# Patient Record
Sex: FEMALE | Race: WHITE | NOT HISPANIC OR LATINO | Employment: FULL TIME | URBAN - METROPOLITAN AREA
[De-identification: names, ages, dates, MRNs, and addresses within clinical notes are randomized per-mention and may not be internally consistent; named-entity substitution may affect disease eponyms.]

---

## 2019-04-16 ENCOUNTER — TRANSCRIBE ORDERS (OUTPATIENT)
Dept: PHYSICAL THERAPY | Facility: CLINIC | Age: 39
End: 2019-04-16

## 2019-04-16 DIAGNOSIS — M79.601 RIGHT ARM PAIN: Primary | ICD-10-CM

## 2019-04-19 ENCOUNTER — TREATMENT (OUTPATIENT)
Dept: PHYSICAL THERAPY | Facility: CLINIC | Age: 39
End: 2019-04-19

## 2019-04-19 DIAGNOSIS — M25.511 ACUTE PAIN OF RIGHT SHOULDER: Primary | ICD-10-CM

## 2019-04-19 PROCEDURE — 97110 THERAPEUTIC EXERCISES: CPT | Performed by: PHYSICAL THERAPIST

## 2019-04-19 PROCEDURE — 97162 PT EVAL MOD COMPLEX 30 MIN: CPT | Performed by: PHYSICAL THERAPIST

## 2019-04-19 NOTE — PROGRESS NOTES
Physical Therapy Initial Evaluation and Plan of Care    TOTAL TIME: 60 MINUTES    Subjective Evaluation    History of Present Illness  Mechanism of injury: Patient presents with ~ 2 month history of right UE pain; states it starts in the right side of cervical spine, extends down into the right hand and fingers    Works at [employer]- does repetitive work packaging materials and supplies, labeling, etc.    Is currently on light duty, but pain is the same  Arm is tingling constantly     Quality of life: fair    Pain  Current pain rating: 3  Quality: dull ache, discomfort, needle-like, tight and radiating  Relieving factors: rest and medications  Aggravating factors: lifting, movement, overhead activity, prolonged positioning and outstretched reach    Patient Goals  Patient goals for therapy: independence with ADLs/IADLs, return to sport/leisure activities, return to work, increased strength, increased motion and decreased pain             Objective       Postural Observations  Seated posture: fair  Standing posture: fair        Palpation     Right Tenderness of the infraspinatus, latissimus, levator scapulae, rhomboids, scalenes, sternocleidomastoid, suboccipitals and upper trapezius.     Tenderness     Right Shoulder  Tenderness in the AC joint and infraspinatus tendon. No tenderness in the clavicle.     Cervical/Thoracic Screen   Cervical range of motion within normal limits with the following exceptions: Pain with cervical extension  Pain/ tightness with cervical rotation    Neurological Testing     Sensation     Shoulder   Left Shoulder   Intact: light touch    Right Shoulder   Intact: light touch    Reflexes   Left   Biceps (C5/C6): normal (2+)  Brachioradialis (C6): normal (2+)  Triceps (C7): normal (2+)    Right   Biceps (C5/C6): normal (2+)  Brachioradialis (C6): normal (2+)  Triceps (C7): normal (2+)    Active Range of Motion   Cervical/Thoracic Spine   Cervical    Flexion: WFL  Extension: WFL and with  pain  Left lateral flexion: WFL  Right lateral flexion: WFL  Left rotation: WFL and with pain  Right rotation: WFL and with pain  Left Shoulder   Normal active range of motion    Right Shoulder   Normal active range of motion  Abduction: with pain  Horizontal adduction: with pain    Strength/Myotome Testing     Right Shoulder     Planes of Motion   Flexion: 4+   Abduction: 4+   External rotation at 0°: 4   Internal rotation at 0°: 4+     Additional Strength Details   strength;  Right: 25#  Left:55#       Tests   Cervical     Right   Positive Spurling's sign.   Negative cervical distraction.     Right Shoulder   Positive empty can.   Negative active compression ([place]), drop arm, lift-off and Aristeo's sign.          Assessment & Plan     Assessment  Impairments: abnormal or restricted ROM, activity intolerance, impaired physical strength, lacks appropriate home exercise program and pain with function  Assessment details: S/S consistent with right UE pain secondary to repetitive work; pain begins in cervical spine and extends down the right arm in a dermatomal fashion; patient should benefit from PT to restore full functional ROM and strength, and decreased pain through the use of modalities and manual therapy  Prognosis: fair  Functional Limitations: carrying objects, lifting, sleeping, pulling, pushing, uncomfortable because of pain, reaching overhead and unable to perform repetitive tasks  Goals  Plan Goals: 4 weeks:  1. IND with HEP  2. Full functional ROM of cervical spine and right shoulder without pain  3. 5/5 MMT strength right UE and equal  strength bilaterally  4. Able to perform work simulated tasks without pain or dysfunction      Plan  Therapy options: will be seen for skilled physical therapy services  Planned modality interventions: iontophoresis, TENS, thermotherapy (hydrocollator packs), ultrasound, traction, high voltage pulsed current (pain management), electrical stimulation/[place]ian  stimulation and cryotherapy  Planned therapy interventions: manual therapy, neuromuscular re-education, postural training, soft tissue mobilization, spinal/joint mobilization, strengthening, stretching, therapeutic activities, joint mobilization, home exercise program, functional ROM exercises, flexibility and body mechanics training  Frequency: 2x week  Duration in visits: 9  Treatment plan discussed with: patient        Manual Therapy:         mins  62851;  Therapeutic Exercise:    30     mins  95474;     Neuromuscular Wendie:        mins  09890;    Therapeutic Activity:          mins  73560;     Gait Training:           mins  86123;     Ultrasound:          mins  52762;    Electrical Stimulation:         mins  43027 ( );  Dry Needling          mins self-pay    Timed Treatment:   30   mins   Total Treatment:     60   mins    PT SIGNATURE: Aris Canas, JONNY   DATE TREATMENT INITIATED: 4/19/2019    Initial Certification  Certification Period: 7/18/2019  I certify that the therapy services are furnished while this patient is under my care.  The services outlined above are required by this patient, and will be reviewed every 90 days.     PHYSICIAN: Bronwyn Lewis PA      DATE:     Please sign and return via fax to  .. Thank you, [facility].

## 2019-04-23 ENCOUNTER — TREATMENT (OUTPATIENT)
Dept: PHYSICAL THERAPY | Facility: CLINIC | Age: 39
End: 2019-04-23

## 2019-04-23 DIAGNOSIS — G89.29 CHRONIC RIGHT SHOULDER PAIN: Primary | ICD-10-CM

## 2019-04-23 DIAGNOSIS — M25.511 CHRONIC RIGHT SHOULDER PAIN: Primary | ICD-10-CM

## 2019-04-23 PROCEDURE — 97110 THERAPEUTIC EXERCISES: CPT | Performed by: PHYSICAL THERAPIST

## 2019-04-23 NOTE — PROGRESS NOTES
Physical Therapy Daily Progress Note    TOTAL TIME: 75 MINUTES    Stefanie Liang reports: right arm continues to hurt; tingling in hand      Objective   See Exercise, Manual, and Modality Logs for complete treatment.     THERAPEUTIC EXERCISES/ACTIVITIES ADDED TODAY: see flow sheets for additions    Assessment/Plan  PATIENT NEEDS CONTINUED PHYSICAL THERAPY IN ORDER TO ACHIEVE FULL ROM, STRENGTH AND FUNCTION WITH NO REPORTS OF PAIN IN ORDER TO RTW WITHOUT RESTRICTIONS AND WITHOUT PAIN OR DYSFUNCTION       Progress per Plan of Care           Manual Therapy:         mins  39839;  Therapeutic Exercise:    60     mins  94864;     Neuromuscular Wendie:        mins  60923;    Therapeutic Activity:          mins  61264;     Gait Training:           mins  59542;     Ultrasound:          mins  67387;    Electrical Stimulation:    15     mins  88276 ( );  Dry Needling          mins self-pay    Timed Treatment:   75   mins   Total Treatment:     75   mins    Aris Canas PT  Physical Therapist

## 2019-04-25 ENCOUNTER — TREATMENT (OUTPATIENT)
Dept: PHYSICAL THERAPY | Facility: CLINIC | Age: 39
End: 2019-04-25

## 2019-04-25 DIAGNOSIS — M25.511 CHRONIC RIGHT SHOULDER PAIN: Primary | ICD-10-CM

## 2019-04-25 DIAGNOSIS — G89.29 CHRONIC RIGHT SHOULDER PAIN: Primary | ICD-10-CM

## 2019-04-25 PROCEDURE — 97110 THERAPEUTIC EXERCISES: CPT | Performed by: PHYSICAL THERAPIST

## 2019-04-25 PROCEDURE — 97014 ELECTRIC STIMULATION THERAPY: CPT | Performed by: PHYSICAL THERAPIST

## 2019-05-01 ENCOUNTER — TREATMENT (OUTPATIENT)
Dept: PHYSICAL THERAPY | Facility: CLINIC | Age: 39
End: 2019-05-01

## 2019-05-01 DIAGNOSIS — G89.29 CHRONIC RIGHT SHOULDER PAIN: Primary | ICD-10-CM

## 2019-05-01 DIAGNOSIS — M25.511 CHRONIC RIGHT SHOULDER PAIN: Primary | ICD-10-CM

## 2019-05-01 PROCEDURE — 97110 THERAPEUTIC EXERCISES: CPT | Performed by: PHYSICAL THERAPIST

## 2019-05-03 ENCOUNTER — TREATMENT (OUTPATIENT)
Dept: PHYSICAL THERAPY | Facility: CLINIC | Age: 39
End: 2019-05-03

## 2019-05-03 DIAGNOSIS — G89.29 CHRONIC RIGHT SHOULDER PAIN: Primary | ICD-10-CM

## 2019-05-03 DIAGNOSIS — M25.511 CHRONIC RIGHT SHOULDER PAIN: Primary | ICD-10-CM

## 2019-05-03 PROCEDURE — 97110 THERAPEUTIC EXERCISES: CPT | Performed by: PHYSICAL THERAPIST

## 2019-05-03 PROCEDURE — 97140 MANUAL THERAPY 1/> REGIONS: CPT | Performed by: PHYSICAL THERAPIST

## 2019-05-03 NOTE — PROGRESS NOTES
Physical Therapy Daily Progress Note    TOTAL TIME: 70 MINUTES    Elen Liang reports: continued pain, work makes it hurt worse, a lot of repetition       Objective   See Exercise, Manual, and Modality Logs for complete treatment.     THERAPEUTIC EXERCISES/ACTIVITIES ADDED TODAY: see flow sheets    Assessment/Plan  PATIENT NEEDS CONTINUED PHYSICAL THERAPY IN ORDER TO ACHIEVE FULL ROM, STRENGTH AND FUNCTION WITH NO REPORTS OF PAIN IN ORDER TO RTW WITHOUT RESTRICTIONS AND WITHOUT PAIN OR DYSFUNCTION       Progress per Plan of Care           Manual Therapy:    55     mins  07791;  Therapeutic Exercise:         mins  52131;     Neuromuscular Wendie:        mins  75458;    Therapeutic Activity:          mins  33377;     Gait Training:           mins  21574;     Ultrasound:          mins  60825;    Electrical Stimulation:  15       mins  69731 ( );  Dry Needling          mins self-pay    Timed Treatment:   55   mins   Total Treatment:     70   mins    Aris Canas PT  Physical Therapist

## 2019-05-07 ENCOUNTER — TREATMENT (OUTPATIENT)
Dept: PHYSICAL THERAPY | Facility: CLINIC | Age: 39
End: 2019-05-07

## 2019-05-07 DIAGNOSIS — G89.29 CHRONIC RIGHT SHOULDER PAIN: Primary | ICD-10-CM

## 2019-05-07 DIAGNOSIS — M25.511 CHRONIC RIGHT SHOULDER PAIN: Primary | ICD-10-CM

## 2019-05-07 PROCEDURE — 97110 THERAPEUTIC EXERCISES: CPT | Performed by: PHYSICAL THERAPIST

## 2019-05-07 NOTE — PROGRESS NOTES
Physical Therapy Daily Progress Note    TOTAL TIME: 75 MINUTES    Elen Liang reports: continued pain, work duties make it worse      Objective   See Exercise, Manual, and Modality Logs for complete treatment.     THERAPEUTIC EXERCISES/ACTIVITIES ADDED TODAY: see flow sheets    Assessment/Plan  PATIENT NEEDS CONTINUED PHYSICAL THERAPY IN ORDER TO ACHIEVE FULL ROM, STRENGTH AND FUNCTION WITH NO REPORTS OF PAIN IN ORDER TO RTW WITHOUT RESTRICTIONS AND WITHOUT PAIN OR DYSFUNCTION       Progress per Plan of Care           Manual Therapy:    10     mins  84676;  Therapeutic Exercise:    55     mins  58411;     Neuromuscular Wendie:        mins  84598;    Therapeutic Activity:          mins  85464;     Gait Training:           mins  52263;     Ultrasound:          mins  83502;    Electrical Stimulation:         mins  03842 ( );  Dry Needling          mins self-pay    Timed Treatment:   65   mins   Total Treatment:     75   mins    Aris Canas PT  Physical Therapist

## 2019-05-10 ENCOUNTER — OFFICE VISIT (OUTPATIENT)
Dept: ORTHOPEDIC SURGERY | Facility: CLINIC | Age: 39
End: 2019-05-10

## 2019-05-10 VITALS — HEIGHT: 61 IN | OXYGEN SATURATION: 98 % | WEIGHT: 113.1 LBS | HEART RATE: 88 BPM | BODY MASS INDEX: 21.35 KG/M2

## 2019-05-10 DIAGNOSIS — M77.8 HAND TENDINITIS: ICD-10-CM

## 2019-05-10 DIAGNOSIS — M54.12 CERVICAL RADICULOPATHY: ICD-10-CM

## 2019-05-10 DIAGNOSIS — M25.511 RIGHT SHOULDER PAIN, UNSPECIFIED CHRONICITY: Primary | ICD-10-CM

## 2019-05-10 PROCEDURE — 99203 OFFICE O/P NEW LOW 30 MIN: CPT | Performed by: ORTHOPAEDIC SURGERY

## 2019-05-10 RX ORDER — CYCLOBENZAPRINE HCL 5 MG
TABLET ORAL
Refills: 1 | COMMUNITY
Start: 2019-04-16

## 2019-05-10 RX ORDER — IBUPROFEN 800 MG/1
TABLET ORAL
Refills: 1 | COMMUNITY
Start: 2019-04-16

## 2019-05-10 NOTE — PROGRESS NOTES
[facility] Note    Subjective     Chief Complaint   Patient presents with   • Right Shoulder - Pain        HPI      Elen Liang is a 39 y.o. female.  She has an  here with her.  Pain started 3 months ago in the right hand and shoulder.  Started with her finger and radiates up to her neck.  Pain is 10 out of 10 at times.  Is burning.  She is supposed to be on work restrictions but is still working her regular job.        History reviewed. No pertinent past medical history.   History reviewed. No pertinent surgical history.   Family History   Problem Relation Age of Onset   • No Known Problems Mother    • No Known Problems Father      Social History     Socioeconomic History   • Marital status:      Spouse name: Not on file   • Number of children: Not on file   • Years of education: Not on file   • Highest education level: Not on file   Tobacco Use   • Smoking status: Never Smoker   • Smokeless tobacco: Never Used   Substance and Sexual Activity   • Alcohol use: No     Frequency: Never   • Drug use: No   • Sexual activity: Defer      Current Outpatient Medications on File Prior to Visit   Medication Sig Dispense Refill   • cyclobenzaprine (FLEXERIL) 5 MG tablet   1   • ibuprofen (ADVIL,MOTRIN) 800 MG tablet   1     No current facility-administered medications on file prior to visit.       No Known Allergies     The following portions of the patient's history were reviewed and updated as appropriate: allergies, current medications, past family history, past medical history, past social history, past surgical history and problem list.    Review of Systems   Constitutional: Negative.    HENT: Negative.    Eyes: Negative.    Respiratory: Negative.    Cardiovascular: Negative.    Gastrointestinal: Negative.    Endocrine: Negative.    Genitourinary: Negative.    Musculoskeletal: Positive for arthralgias.   Skin: Negative.    Allergic/Immunologic: Negative.    Neurological:  "Negative.    Hematological: Negative.    Psychiatric/Behavioral: Negative.         Objective      Physical Exam  Pulse 88   Ht 154.9 cm (61\")   Wt 51.3 kg (113 lb 1.5 oz)   SpO2 98%   BMI 21.37 kg/m²     Body mass index is 21.37 kg/m².        GENERAL APPEARANCE: awake, alert & oriented x 3, in no acute distress and well developed, well nourished  PSYCH: normal mood and affect  LUNGS:  breathing nonlabored, no wheezing  EYES: sclera anicteric, pupils equal  CARDIOVASCULAR: palpable pulses dorsalis pedis, palpable posterior tibial bilaterally. Capillary refill less than 2 seconds  INTEGUMENTARY: skin intact, no clubbing, cyanosis  NEUROLOGIC:  Normal Sensation and reflexes             Ortho Exam  Musculoskeletal   Upper Extremity   Right Shoulder     Inspection and Palpation:     Tenderness - none    Crepitus - none    Sensation is normal    Examination reveals no ecchymosis.      Strength and Tone:    Supraspinatus,  Infraspinatus - 5/5    Subscapularis - 5/5    Deltoid - 5/5     Range of Motion   Left shoulder:    Internal Rotation: ROM - T7    External Rotation: AROM - 80 degrees    Elevation through flexion: AROM - 180 degrees    Right Shoulder:    Internal Rotation: ROM - T7    External Rotation: AROM - 80 degrees    Elevation through flexion: AROM - 180 degrees     Abduction -180 degrees     Instability   Right shoulder    Sulcus sign negative    Apprehension test negative    Bill relocation test negative    Jerk test negative   Impingement   Right shoulder    M[place]illa impingement test positive    Neer impingement test positive   Functional Testing   Right shoulder    AC crossover adduction test negative    Abdominal compression test negative    Lift-off sign negative    Speed's test negative    Jin's test negative    Horriblower's sign negative   Neurologic   Left Upper Extremity    Left wrist extensors: 5/5    Left wrist flexors: 5/5    Left intrinsics: 5/5      Right Upper Extremity    Right wrist " extensors: 5/5    Right wrist flexors: 5/5    Right intrinsics: 5/5    Musculoskeletal   Left Elbow    Forearm supination: AROM - 90 degrees    Forearm pronation: AROM - 90 degrees     Right Elbow    Forearm supination: AROM - 90 degrees    Forearm pronation: AROM - 90 degrees     Left Wrist    Flexion: AROM - 90 degrees    Extension: AROM - 90 degrees     Right Wrist    Flexion: AROM - 90 degrees    Extension: AROM - 90 degrees     Inspection and Palpation   Right Hand:      Tenderness - none    Swelling - none    Crepitus - none    Muscle tone - no atrophy     Left Hand    Tenderness - none    Swelling - none    Crepitus - none    Muscle tone - no atrophy     Strength and Tone    Right  strength: good    Left  strength: good     Deformities, Malalignments, Discrepancies    None     Phalanges    Full range of motion of bilateral thumb MCPJs and IPJs.     Full range of motion of bilateral index finger MCPJs, PIPs, and DIPJs    Full range of motion of bilateral middle finder MCPJs, PIPs, and DIPJs    Full range of motion of bilateral ring finger MCPJs, PIPs, and DIPJs    Full range of motion of bilateral small finger MCPJs, PIPs, and DIPJs      FDS, FDP, and extensor tendons are intact in bilateral index, middle, ring, and small fingers. FPL and extensor tendons are intact in bilateral thumbs.  No palpable triggering.    Imaging/Studies  Imaging Results (last 7 days)     Procedure Component Value Units Date/Time    XR Hand 3+ View Right [[id number]] Resulted:  05/10/19 1126     Updated:  05/10/19 1127    Addenda:        Right Hand X-Ray  Indication: Pain  AP, Lateral, and Oblique views    Findings:  No fracture  No bony lesion  Normal soft tissues  Normal joint spaces    No prior studies were available for comparison.  Signed:  05/10/19 1127 by Ezequiel Higgins MD    Narrative:       Right Shoulder X-Ray  Indication: Pain  AP, scapular Y, and axillary lateral views    Findings:  No fracture  No bony  lesion  Normal soft tissues  Normal joint spaces    No prior studies were available for comparison.      XR Shoulder 2+ View Right [[id number]] Resulted:  05/10/19 1127     Updated:  05/10/19 1127    Narrative:       Right Shoulder X-Ray  Indication: Pain  AP, scapular Y, and axillary lateral views    Findings:  No fracture  No bony lesion  Normal soft tissues  Normal joint spaces    No prior studies were available for comparison.            Assessment/Plan        ICD-10-CM ICD-9-CM   1. Right shoulder pain, unspecified chronicity M25.511 719.41   2. Cervical radiculopathy M54.12 723.4   3. Hand tendinitis M77.8 727.05       Orders Placed This Encounter   Procedures   • XR Shoulder 2+ View Right   • XR Hand 3+ View Right   • Ambulatory Referral to Physical Therapy      I have ordered physical therapy.  She is on work restrictions no use right arm.  I will see her back in 3 weeks.  She may have a cervical issue as the pain appears to be radicular in nature.    Medical Decision Making  Management Options : over-the-counter medicine and physical/occupational therapy  Data/Risk: radiology tests and independent visualization of imaging, lab tests, or EMG/NCV    Ezequiel Higgins MD  05/10/19  11:28 AM         EMR Dragon/Transcription disclaimer:  Much of this encounter note is an electronic transcription of spoken language to printed text. Electronic transcription of spoken language may permit erroneous, or at times, nonsensical words or phrases to be inadvertently transcribed. Although I have reviewed the note for such errors, some may still exist.

## 2019-05-13 NOTE — PROGRESS NOTES
Physical Therapy Daily Progress Note    TOTAL TIME: 75 MINUTES    Elen Liang reports: feels a little better after PT, feels better when not working; repetition makes pain worse      Objective   See Exercise, Manual, and Modality Logs for complete treatment.     THERAPEUTIC EXERCISES/ACTIVITIES ADDED TODAY: see flow sheets     Assessment/Plan  Patient making minimal progress to date; continues to work repetitive job and is reporting increased pain with work duties    Progress per Plan of Care           Manual Therapy:    10     mins  96407;  Therapeutic Exercise:    55     mins  81525;     Neuromuscular Wendie:        mins  61662;    Therapeutic Activity:          mins  72452;     Gait Training:           mins  11427;     Ultrasound:          mins  93430;    Electrical Stimulation:         mins  77593 ( );  Dry Needling          mins self-pay    Timed Treatment:   65   mins   Total Treatment:     75   mins    Aris Canas PT  Physical Therapist

## 2019-05-13 NOTE — PROGRESS NOTES
Physical Therapy Daily Progress Note    TOTAL TIME: 80 MINUTES    Elen Liang reports: arm pain increases with work duties; less pain at rest; still doing most of normal job      Objective   See Exercise, Manual, and Modality Logs for complete treatment.     THERAPEUTIC EXERCISES/ACTIVITIES ADDED TODAY: see flow sheets    Assessment/Plan  Minimal progress to date; per patient report appears to be working beyond restrictions and is having pain with these repetitive duties    Progress per Plan of Care           Manual Therapy:         mins  99487;  Therapeutic Exercise:    70     mins  47202;     Neuromuscular Wendie:        mins  67916;    Therapeutic Activity:          mins  12393;     Gait Training:           mins  88843;     Ultrasound:          mins  15759;    Electrical Stimulation:         mins  74618 ( );  Dry Needling          mins self-pay    Timed Treatment:   70   mins   Total Treatment:     80   mins    Aris Canas PT  Physical Therapist

## 2019-05-16 ENCOUNTER — TREATMENT (OUTPATIENT)
Dept: PHYSICAL THERAPY | Facility: CLINIC | Age: 39
End: 2019-05-16

## 2019-05-16 DIAGNOSIS — M25.511 CHRONIC RIGHT SHOULDER PAIN: Primary | ICD-10-CM

## 2019-05-16 DIAGNOSIS — G89.29 CHRONIC RIGHT SHOULDER PAIN: Primary | ICD-10-CM

## 2019-05-16 PROCEDURE — 97140 MANUAL THERAPY 1/> REGIONS: CPT | Performed by: PHYSICAL THERAPIST

## 2019-05-16 PROCEDURE — 97110 THERAPEUTIC EXERCISES: CPT | Performed by: PHYSICAL THERAPIST

## 2019-05-16 NOTE — PROGRESS NOTES
Physical Therapy Daily Progress Note    TOTAL TIME: 75 MINUTES    Elen Liang reports: feels a little better, have been off of work this week secondary to new work restrictions       Objective   See Exercise, Manual, and Modality Logs for complete treatment.     Pain with left cervical rotation and side bending; pain with full right shoulder flexion     THERAPEUTIC EXERCISES/ACTIVITIES ADDED TODAY: see flow sheets    Assessment/Plan  Work restrictions are allowing limited use of right arm and patient is having decreased overall pain; needs continued PT to decrease pain and improve function    Progress per Plan of Care and Progress strengthening /stabilization /functional activity           Manual Therapy:    10     mins  15207;  Therapeutic Exercise:    50     mins  50187;     Neuromuscular Wendie:        mins  53182;    Therapeutic Activity:          mins  51193;     Gait Training:           mins  40458;     Ultrasound:          mins  42883;    Electrical Stimulation:    15     mins  33929 ( );  Dry Needling          mins self-pay    Timed Treatment:   75   mins   Total Treatment:     75   mins    Aris Canas, JONNY  Physical Therapist

## 2019-05-21 ENCOUNTER — TREATMENT (OUTPATIENT)
Dept: PHYSICAL THERAPY | Facility: CLINIC | Age: 39
End: 2019-05-21

## 2019-05-21 DIAGNOSIS — M25.511 CHRONIC RIGHT SHOULDER PAIN: Primary | ICD-10-CM

## 2019-05-21 DIAGNOSIS — G89.29 CHRONIC RIGHT SHOULDER PAIN: Primary | ICD-10-CM

## 2019-05-21 PROCEDURE — 97014 ELECTRIC STIMULATION THERAPY: CPT | Performed by: PHYSICAL THERAPIST

## 2019-05-21 PROCEDURE — 97110 THERAPEUTIC EXERCISES: CPT | Performed by: PHYSICAL THERAPIST

## 2019-05-21 NOTE — PROGRESS NOTES
Physical Therapy Daily Progress Note    TOTAL TIME: 75 MINUTES    Elen Liang reports: feels better, still not working so arm is getting some rest      Objective   See Exercise, Manual, and Modality Logs for complete treatment.     THERAPEUTIC EXERCISES/ACTIVITIES ADDED TODAY: see flow sheets    Assessment/Plan  PATIENT NEEDS CONTINUED PHYSICAL THERAPY IN ORDER TO ACHIEVE FULL ROM, STRENGTH AND FUNCTION WITH NO REPORTS OF PAIN IN ORDER TO RTW WITHOUT RESTRICTIONS AND WITHOUT PAIN OR DYSFUNCTION       Progress per Plan of Care           Manual Therapy:         mins  41372;  Therapeutic Exercise:    60     mins  55817;     Neuromuscular Wendie:        mins  98686;    Therapeutic Activity:          mins  78793;     Gait Training:           mins  47302;     Ultrasound:          mins  09409;    Electrical Stimulation:    15     mins  88059 ( );  Dry Needling          mins self-pay    Timed Treatment:   75   mins   Total Treatment:     75   mins    Aris Canas PT  Physical Therapist

## 2019-05-23 ENCOUNTER — TREATMENT (OUTPATIENT)
Dept: PHYSICAL THERAPY | Facility: CLINIC | Age: 39
End: 2019-05-23

## 2019-05-23 DIAGNOSIS — M25.511 CHRONIC RIGHT SHOULDER PAIN: Primary | ICD-10-CM

## 2019-05-23 DIAGNOSIS — G89.29 CHRONIC RIGHT SHOULDER PAIN: Primary | ICD-10-CM

## 2019-05-23 PROCEDURE — 97110 THERAPEUTIC EXERCISES: CPT | Performed by: PHYSICAL THERAPIST

## 2019-05-23 PROCEDURE — 97014 ELECTRIC STIMULATION THERAPY: CPT | Performed by: PHYSICAL THERAPIST

## 2019-05-23 NOTE — PROGRESS NOTES
Physical Therapy Daily Progress Note    TOTAL TIME: 70 MINUTES    Elen Liang reports: patient reports less pain, primarily in the ventral/medial forearm and medial epicondyle / flexor tendon; occasional pain into digits 3-4; states that being off of work has helped with pain reduction      Objective   See Exercise, Manual, and Modality Logs for complete treatment.     THERAPEUTIC EXERCISES/ACTIVITIES ADDED TODAY: see flow sheets     Assessment/Plan  PATIENT NEEDS CONTINUED PHYSICAL THERAPY IN ORDER TO ACHIEVE FULL ROM, STRENGTH AND FUNCTION WITH NO REPORTS OF PAIN IN ORDER TO RTW WITHOUT RESTRICTIONS AND WITHOUT PAIN OR DYSFUNCTION       Progress per Plan of Care           Manual Therapy:         mins  27620;  Therapeutic Exercise:    55     mins  53517;     Neuromuscular Wendie:        mins  60435;    Therapeutic Activity:          mins  27358;     Gait Training:           mins  82047;     Ultrasound:          mins  46490;    Electrical Stimulation:    15     mins  15523 ( );  Dry Needling          mins self-pay    Timed Treatment:   70   mins   Total Treatment:     70   mins    Aris Canas, PT  Physical Therapist

## 2019-05-28 ENCOUNTER — TREATMENT (OUTPATIENT)
Dept: PHYSICAL THERAPY | Facility: CLINIC | Age: 39
End: 2019-05-28

## 2019-05-28 DIAGNOSIS — G89.29 CHRONIC RIGHT SHOULDER PAIN: Primary | ICD-10-CM

## 2019-05-28 DIAGNOSIS — M25.511 CHRONIC RIGHT SHOULDER PAIN: Primary | ICD-10-CM

## 2019-05-28 PROCEDURE — 97110 THERAPEUTIC EXERCISES: CPT | Performed by: PHYSICAL THERAPIST

## 2019-05-28 PROCEDURE — 97140 MANUAL THERAPY 1/> REGIONS: CPT | Performed by: PHYSICAL THERAPIST

## 2019-05-30 ENCOUNTER — TREATMENT (OUTPATIENT)
Dept: PHYSICAL THERAPY | Facility: CLINIC | Age: 39
End: 2019-05-30

## 2019-05-30 DIAGNOSIS — G89.29 CHRONIC RIGHT SHOULDER PAIN: Primary | ICD-10-CM

## 2019-05-30 DIAGNOSIS — M25.511 CHRONIC RIGHT SHOULDER PAIN: Primary | ICD-10-CM

## 2019-05-30 PROCEDURE — 97140 MANUAL THERAPY 1/> REGIONS: CPT | Performed by: PHYSICAL THERAPIST

## 2019-05-30 PROCEDURE — 97110 THERAPEUTIC EXERCISES: CPT | Performed by: PHYSICAL THERAPIST

## 2019-05-30 NOTE — PROGRESS NOTES
Physical Therapy Daily Progress Note    TOTAL TIME: 75 MINUTES    Elen Liang reports: right shoulder and elbow feel quite a bit better, is helping that I'm not working right now; main pain is in right fingers when pulling or lifting something      Objective   See Exercise, Manual, and Modality Logs for complete treatment.     THERAPEUTIC EXERCISES/ACTIVITIES ADDED TODAY: see flow sheets   Manual therapy: right hand, wrist and finger mobilizations    Assessment/Plan  Pain with finger flexion resistance; tenderness with full finger flexion and extension possibly due to chronic flexor tendonitis; should benefit from continued PT    Progress per Plan of Care           Manual Therapy:    15     mins  04300;  Therapeutic Exercise:    60     mins  11505;     Neuromuscular Wendie:        mins  75901;    Therapeutic Activity:          mins  13856;     Gait Training:           mins  30619;     Ultrasound:          mins  10234;    Electrical Stimulation:         mins  54361 ( );  Dry Needling          mins self-pay    Timed Treatment:   75   mins   Total Treatment:     75   mins    Aris Canas, PT  Physical Therapist

## 2019-05-31 ENCOUNTER — OFFICE VISIT (OUTPATIENT)
Dept: ORTHOPEDIC SURGERY | Facility: CLINIC | Age: 39
End: 2019-05-31

## 2019-05-31 VITALS — WEIGHT: 113.1 LBS | BODY MASS INDEX: 21.35 KG/M2 | OXYGEN SATURATION: 99 % | HEIGHT: 61 IN | HEART RATE: 85 BPM

## 2019-05-31 DIAGNOSIS — M77.8 HAND TENDINITIS: ICD-10-CM

## 2019-05-31 DIAGNOSIS — M25.511 RIGHT SHOULDER PAIN, UNSPECIFIED CHRONICITY: Primary | ICD-10-CM

## 2019-05-31 DIAGNOSIS — M54.12 CERVICAL RADICULOPATHY: ICD-10-CM

## 2019-05-31 PROCEDURE — 99213 OFFICE O/P EST LOW 20 MIN: CPT | Performed by: ORTHOPAEDIC SURGERY

## 2019-05-31 NOTE — PROGRESS NOTES
[facility] [place] [facility]opaedic Surgery Clinic Note    Subjective     Chief Complaint   Patient presents with   • Follow-up     3 week f/u; Right shoulder pain        HPI      Elen Liang is a 39 y.o. female.  She is follow-up right arm issues.  Her neck and shoulder is much better.  All of her pain is in her right middle finger.  It is 8 out of 10 dull throbbing stabbing and shooting.  It hurts to move it and to work with it.  She is on restrictions.        History reviewed. No pertinent past medical history.   No past surgical history on file.   Family History   Problem Relation Age of Onset   • No Known Problems Mother    • No Known Problems Father      Social History     Socioeconomic History   • Marital status:      Spouse name: Not on file   • Number of children: Not on file   • Years of education: Not on file   • Highest education level: Not on file   Tobacco Use   • Smoking status: Never Smoker   • Smokeless tobacco: Never Used   Substance and Sexual Activity   • Alcohol use: No     Frequency: Never   • Drug use: No   • Sexual activity: Defer      Current Outpatient Medications on File Prior to Visit   Medication Sig Dispense Refill   • cyclobenzaprine (FLEXERIL) 5 MG tablet   1   • ibuprofen (ADVIL,MOTRIN) 800 MG tablet   1     No current facility-administered medications on file prior to visit.       No Known Allergies     The following portions of the patient's history were reviewed and updated as appropriate: allergies, current medications, past family history, past medical history, past social history, past surgical history and problem list.    Review of Systems   Constitutional: Negative.    HENT: Negative.    Eyes: Negative.    Respiratory: Negative.    Cardiovascular: Negative.    Gastrointestinal: Negative.    Endocrine: Negative.    Genitourinary: Negative.    Musculoskeletal: Positive for arthralgias.   Skin: Negative.    Allergic/Immunologic: Negative.    Neurological: Negative.    Hematological:  "Negative.    Psychiatric/Behavioral: Negative.         Objective      Physical Exam  Pulse 85   Ht 154.9 cm (60.98\")   Wt 51.3 kg (113 lb 1.5 oz)   SpO2 99%   Breastfeeding? No   BMI 21.38 kg/m²     Body mass index is 21.38 kg/m².        GENERAL APPEARANCE: awake, alert & oriented x 3, in no acute distress and well developed, well nourished  PSYCH: normal mood and affect      Ortho Exam  Musculoskeletal   Upper Extremity   Right Shoulder     Inspection and Palpation:     Tenderness - none    Crepitus - none    Sensation is normal    Examination reveals no ecchymosis.      Strength and Tone:    Supraspinatus,  Infraspinatus - 5/5    Subscapularis - 5/5    Deltoid - 5/5     Range of Motion   Left shoulder:    Internal Rotation: ROM - T7    External Rotation: AROM - 80 degrees    Elevation through flexion: AROM - 180 degrees    Right Shoulder:    Internal Rotation: ROM - T7    External Rotation: AROM - 80 degrees    Elevation through flexion: AROM - 180 degrees     Abduction -180 degrees     Instability   Right shoulder    Sulcus sign negative    Apprehension test negative    Bill relocation test negative    Jerk test negative   Impingement   Right shoulder    Mancilla impingement test positive    Neer impingement test positive   Functional Testing   Right shoulder    AC crossover adduction test negative    Abdominal compression test negative    Lift-off sign negative    Speed's test negative    Jin's test negative    Horriblower's sign negative   Peripheral Vascular   Left Upper Extremity    No cyanotic nail beds    Pink nail beds and rapid capillary refill   Palpation    Radial Pulse - Bilaterally normal    Neurologic   Sensory: Light touch intact- Right and left hand    Left Upper Extremity    Left wrist extensors: 5/5    Left wrist flexors: 5/5    Left intrinsics: 5/5   Right Upper Extremity    Right wrist extensors: 5/5    Right wrist flexors: 5/5    Right intrinsics: 5/5   Left Elbow    Forearm supination: " AROM - 90 degrees    Forearm pronation: AROM - 90 degrees     Right Elbow    Forearm supination: AROM - 90 degrees    Forearm pronation: AROM - 90 degrees     Left Wrist    Flexion: AROM - 90 degrees    Extension: AROM - 90 degrees     Right Wrist    Flexion: AROM - 90 degrees    Extension: AROM - 90 degrees     Inspection and Palpation   Right Hand:      Tenderness -no finger with full motion    Swelling - none    Crepitus - none    Muscle tone - no atrophy     Left Hand    Tenderness - none    Swelling - none    Crepitus - none    Muscle tone - no atrophy     Strength and Tone    Right  strength: good    Left  strength: good     Deformities, Malalignments, Discrepancies    None     Phalanges    Full range of motion of bilateral thumb MCPJs and IPJs.     Full range of motion of bilateral index finger MCPJs, PIPs, and DIPJs    Full range of motion of bilateral middle finder MCPJs, PIPs, and DIPJs    Full range of motion of bilateral ring finger MCPJs, PIPs, and DIPJs    Full range of motion of bilateral small finger MCPJs, PIPs, and DIPJs      FDS, FDP, and extensor tendons are intact in bilateral index, middle, ring, and small fingers. FPL and extensor tendons are intact in bilateral thumbs.  No palpable triggering.    Imaging/Studies  Imaging Results (last 7 days)     ** No results found for the last 168 hours. **          Assessment/Plan        ICD-10-CM ICD-9-CM   1. Right shoulder pain, unspecified chronicity M25.511 719.41   2. Cervical radiculopathy M54.12 723.4   3. Hand tendinitis M77.8 727.05       Orders Placed This Encounter   Procedures   • Ambulatory Referral to Physical Therapy      Her shoulder neck is doing better with physical therapy.  I will order physical therapy to treat her finger tendinitis.  She will follow-up in a month.  Her work restrictions are no use right hand.  She will continue anti-inflammatories.  She was here with an  today all of her questions were  answered.    Medical Decision Making  Management Options : over-the-counter medicine and physical/occupational therapy      Ezequiel Higgins MD  05/31/19  11:44 AM         EMR Dragon/Transcription disclaimer:  Much of this encounter note is an electronic transcription of spoken language to printed text. Electronic transcription of spoken language may permit erroneous, or at times, nonsensical words or phrases to be inadvertently transcribed. Although I have reviewed the note for such errors, some may still exist.

## 2019-05-31 NOTE — PROGRESS NOTES
Physical Therapy Daily Progress Note    TOTAL TIME: 70 MINUTES    Elen Liang reports: patient reports that the pain in her neck, shoulder, elbow and forearm are much improved; chief complaint at this point is her 3rd digit on right hand, finger is sore and stiff, has pain with pulling and grasping activities      Objective   See Exercise, Manual, and Modality Logs for complete treatment.     Pain, tenderness at MCP, PIP and DIP of 3rd digit on right hand; pain with A/PROM of this digit; pain with manual resistance of finger flexors; occasional tenderness in medial wrist;   Negative Tinel's sign at wrist   Full cervical, shoulder and elbow ROM    THERAPEUTIC EXERCISES/ACTIVITIES ADDED TODAY: see flow sheets     Assessment/Plan  Patient has made good progress, time off from work has likely been beneficial for her healing; pain primarily concentrated in her right hand and 3rd digit at this time; will f/u with MD and proceed with PT per MD recommendations    Awaiting MD orders           Manual Therapy:    15     mins  08053;  Therapeutic Exercise:    55     mins  90184;     Neuromuscular Wendie:        mins  01744;    Therapeutic Activity:          mins  68840;     Gait Training:           mins  30802;     Ultrasound:          mins  91462;    Electrical Stimulation:         mins  80343 ( );  Dry Needling          mins self-pay    Timed Treatment:   70   mins   Total Treatment:     70   mins    Aris Canas, PT  Physical Therapist

## 2019-06-06 ENCOUNTER — TREATMENT (OUTPATIENT)
Dept: PHYSICAL THERAPY | Facility: CLINIC | Age: 39
End: 2019-06-06

## 2019-06-06 DIAGNOSIS — M25.511 CHRONIC RIGHT SHOULDER PAIN: Primary | ICD-10-CM

## 2019-06-06 DIAGNOSIS — G89.29 CHRONIC RIGHT SHOULDER PAIN: Primary | ICD-10-CM

## 2019-06-06 PROCEDURE — 97140 MANUAL THERAPY 1/> REGIONS: CPT | Performed by: PHYSICAL THERAPIST

## 2019-06-06 PROCEDURE — 97110 THERAPEUTIC EXERCISES: CPT | Performed by: PHYSICAL THERAPIST

## 2019-06-07 NOTE — PROGRESS NOTES
Physical Therapy Daily Progress Note    TOTAL TIME: 80 MINUTES    Elen Liang reports: finger still hurts but everything else much better      Objective   See Exercise, Manual, and Modality Logs for complete treatment.     THERAPEUTIC EXERCISES/ACTIVITIES ADDED TODAY: see flow sheets    Assessment/Plan  Tenderness in digit 3 of right hand; edematous compared to contralateral hand digit 3; pain with grasping and pulling activities; consistent with finger flexor tendonitis; needs continued PT     Progress per Plan of Care           Manual Therapy:    20     mins  78942;  Therapeutic Exercise:    60     mins  30487;     Neuromuscular Wendie:        mins  11705;    Therapeutic Activity:          mins  28744;     Gait Training:           mins  41331;     Ultrasound:          mins  88202;    Electrical Stimulation:         mins  00586 ( );  Dry Needling          mins self-pay    Timed Treatment:   80   mins   Total Treatment:     80   mins    Aris Canas, PT  Physical Therapist

## 2019-06-11 ENCOUNTER — TREATMENT (OUTPATIENT)
Dept: PHYSICAL THERAPY | Facility: CLINIC | Age: 39
End: 2019-06-11

## 2019-06-11 DIAGNOSIS — G89.29 CHRONIC RIGHT SHOULDER PAIN: Primary | ICD-10-CM

## 2019-06-11 DIAGNOSIS — M25.511 CHRONIC RIGHT SHOULDER PAIN: Primary | ICD-10-CM

## 2019-06-11 PROCEDURE — 97110 THERAPEUTIC EXERCISES: CPT | Performed by: PHYSICAL THERAPIST

## 2019-06-11 PROCEDURE — 97140 MANUAL THERAPY 1/> REGIONS: CPT | Performed by: PHYSICAL THERAPIST

## 2019-06-12 NOTE — PROGRESS NOTES
Physical Therapy Daily Progress Note    TOTAL TIME: 75 MINUTES    Elen Liang reports: continued pain in right hand and 3rd digit; neck and right UE feel much better      Objective   See Exercise, Manual, and Modality Logs for complete treatment.     THERAPEUTIC EXERCISES/ACTIVITIES ADDED TODAY: see flow sheets     Assessment/Plan  PATIENT NEEDS CONTINUED PHYSICAL THERAPY IN ORDER TO ACHIEVE FULL ROM, STRENGTH AND FUNCTION WITH NO REPORTS OF PAIN IN ORDER TO RTW WITHOUT RESTRICTIONS AND WITHOUT PAIN OR DYSFUNCTION       Progress per Plan of Care and Progress strengthening /stabilization /functional activity           Manual Therapy:    20     mins  26510;  Therapeutic Exercise:    45     mins  75447;     Neuromuscular Wendie:        mins  41098;    Therapeutic Activity:          mins  24253;     Gait Training:           mins  58146;     Ultrasound:          mins  81538;    Electrical Stimulation:         mins  92408 ( );  Dry Needling          mins self-pay    Timed Treatment:   65   mins   Total Treatment:     75   mins    Aris Canas, PT  Physical Therapist

## 2019-06-13 ENCOUNTER — TREATMENT (OUTPATIENT)
Dept: PHYSICAL THERAPY | Facility: CLINIC | Age: 39
End: 2019-06-13

## 2019-06-13 DIAGNOSIS — M25.511 CHRONIC RIGHT SHOULDER PAIN: Primary | ICD-10-CM

## 2019-06-13 DIAGNOSIS — G89.29 CHRONIC RIGHT SHOULDER PAIN: Primary | ICD-10-CM

## 2019-06-13 PROCEDURE — 97140 MANUAL THERAPY 1/> REGIONS: CPT | Performed by: PHYSICAL THERAPIST

## 2019-06-13 PROCEDURE — 97110 THERAPEUTIC EXERCISES: CPT | Performed by: PHYSICAL THERAPIST

## 2019-06-14 NOTE — PROGRESS NOTES
Physical Therapy Daily Progress Note    TOTAL TIME: 80 MINUTES    Elen Liang reports: finger continues to hurt; off work at this time; shoulder/neck and elbow all feel much better      Objective   See Exercise, Manual, and Modality Logs for complete treatment.     THERAPEUTIC EXERCISES/ACTIVITIES ADDED TODAY: see flow sheets     Assessment/Plan  PATIENT NEEDS CONTINUED PHYSICAL THERAPY IN ORDER TO ACHIEVE FULL ROM, STRENGTH AND FUNCTION WITH NO REPORTS OF PAIN IN ORDER TO RTW WITHOUT RESTRICTIONS AND WITHOUT PAIN OR DYSFUNCTION       Progress per Plan of Care           Manual Therapy:    10     mins  49904;  Therapeutic Exercise:    60     mins  86975;     Neuromuscular Wendie:        mins  55827;    Therapeutic Activity:          mins  09628;     Gait Training:           mins  01902;     Ultrasound:     10     mins  35336;    Electrical Stimulation:         mins  37353 ( );  Dry Needling          mins self-pay    Timed Treatment:   80   mins   Total Treatment:     80   mins    Aris Canas, PT  Physical Therapist

## 2019-06-18 ENCOUNTER — TREATMENT (OUTPATIENT)
Dept: PHYSICAL THERAPY | Facility: CLINIC | Age: 39
End: 2019-06-18

## 2019-06-18 DIAGNOSIS — G89.29 CHRONIC RIGHT SHOULDER PAIN: Primary | ICD-10-CM

## 2019-06-18 DIAGNOSIS — M25.511 CHRONIC RIGHT SHOULDER PAIN: Primary | ICD-10-CM

## 2019-06-18 PROCEDURE — 97110 THERAPEUTIC EXERCISES: CPT | Performed by: PHYSICAL THERAPIST

## 2019-06-18 PROCEDURE — 97140 MANUAL THERAPY 1/> REGIONS: CPT | Performed by: PHYSICAL THERAPIST

## 2019-06-18 NOTE — PROGRESS NOTES
Physical Therapy Daily Progress Note    TOTAL TIME: 80 MINUTES    Elen Liang reports: continued pain in only the 3rd digit, palmar MCP as well as PIP, DIP      Objective   See Exercise, Manual, and Modality Logs for complete treatment.     THERAPEUTIC EXERCISES/ACTIVITIES ADDED TODAY: see flow sheets    Assessment/Plan  Has made good overall progress; pain only in 3rd digit and palm at this time; needs continued PT to improve function and RTW    Progress per Plan of Care and Progress strengthening /stabilization /functional activity           Manual Therapy:    20     mins  89434;  Therapeutic Exercise:    50     mins  59681;     Neuromuscular Wendie:        mins  28935;    Therapeutic Activity:          mins  32824;     Gait Training:           mins  29521;     Ultrasound:     10     mins  76363;    Electrical Stimulation:         mins  87392 ( );  Dry Needling          mins self-pay    Timed Treatment:   80   mins   Total Treatment:     80   mins    Aris Canas PT  Physical Therapist

## 2019-06-20 ENCOUNTER — TREATMENT (OUTPATIENT)
Dept: PHYSICAL THERAPY | Facility: CLINIC | Age: 39
End: 2019-06-20

## 2019-06-20 DIAGNOSIS — G89.29 CHRONIC RIGHT SHOULDER PAIN: Primary | ICD-10-CM

## 2019-06-20 DIAGNOSIS — M25.511 CHRONIC RIGHT SHOULDER PAIN: Primary | ICD-10-CM

## 2019-06-20 PROCEDURE — 97110 THERAPEUTIC EXERCISES: CPT | Performed by: PHYSICAL THERAPIST

## 2019-06-20 PROCEDURE — 97140 MANUAL THERAPY 1/> REGIONS: CPT | Performed by: PHYSICAL THERAPIST

## 2019-06-20 NOTE — PROGRESS NOTES
Physical Therapy Daily Progress Note    TOTAL TIME: 80 MINUTES    Elen Liang reports: pain continues to improve overall, still with some pain with pulling, grasping in the 3rd digit, ventral and dorsal MCP, flexor tendon as well as extensor locke       Objective   See Exercise, Manual, and Modality Logs for complete treatment.     THERAPEUTIC EXERCISES/ACTIVITIES ADDED TODAY: see flow sheets    Assessment/Plan  Patient's pain is much better overall; no longer has neck, shoulder, elbow or wrist pain; pain isolated now to 3rd digit of right hand; continue with right UE strengthening, manual therapy and modalities prn to decrease pain and improve function    Progress per Plan of Care and Progress strengthening /stabilization /functional activity           Manual Therapy:    10     mins  01337;  Therapeutic Exercise:    50     mins  90421;     Neuromuscular Wendie:        mins  29519;    Therapeutic Activity:          mins  17846;     Gait Training:           mins  05642;     Ultrasound:     10     mins  64765;    Electrical Stimulation:         mins  32064 ( );  Dry Needling          mins self-pay    Timed Treatment:   70   mins   Total Treatment:     70   mins    Aris Canas PT  Physical Therapist

## 2019-06-24 ENCOUNTER — TREATMENT (OUTPATIENT)
Dept: PHYSICAL THERAPY | Facility: CLINIC | Age: 39
End: 2019-06-24

## 2019-06-24 DIAGNOSIS — M25.511 ACUTE PAIN OF RIGHT SHOULDER: ICD-10-CM

## 2019-06-24 DIAGNOSIS — G89.29 CHRONIC RIGHT SHOULDER PAIN: Primary | ICD-10-CM

## 2019-06-24 DIAGNOSIS — M25.511 CHRONIC RIGHT SHOULDER PAIN: Primary | ICD-10-CM

## 2019-06-24 PROCEDURE — 97110 THERAPEUTIC EXERCISES: CPT | Performed by: PHYSICAL THERAPIST

## 2019-06-24 PROCEDURE — 97140 MANUAL THERAPY 1/> REGIONS: CPT | Performed by: PHYSICAL THERAPIST

## 2019-06-24 NOTE — PROGRESS NOTES
Physical Therapy Daily Progress Note      Visit # : 17    Elen Liang reports 0/10 pain today at rest.  Pt still having pain with touching and grasping in the R UE.          Objective Pt presents to PT today with no distress noted at rest.     C/S ROM is full and pain free.      Supine R shoulder flexion 145 degrees then arm sxs elevated.  After stm and scapular mobs she was able to get 165 degrees R shoulder flexion.     Nerve glides in the R UE minimally guarded and tender.     R UT and R levator and periscapular MM still slightly guarded and painful.     R UE 33#, 32#, 49#   L UE 57#, 48#, 52#      See Exercise, Manual, and Modality Logs for complete treatment.     Assessment/Plan  Pt with R UE ROM still limited and strength slightly limited as compared to the L UE.  She seems to have some periscapular MM weakness and tightness that seems to be placing some pressure on the R brachial plexus.         Progress per Plan of Care  Continue with PT.            Manual Therapy:    12     mins  17870;  Therapeutic Exercise:    50     mins  41028;     Neuromuscular Wendie:        mins  28452;    Therapeutic Activity:          mins  79982;     Gait Training:        ___  mins  68531;     Ultrasound:          mins  21836;    Electrical Stimulation:         mins  29280 ( );  Dry Needling          mins self-pay    Timed Treatment:   62   mins   Total Treatment:     62   mins    Sunday Kohli, PT  Physical Therapist

## 2019-06-28 ENCOUNTER — TREATMENT (OUTPATIENT)
Dept: PHYSICAL THERAPY | Facility: CLINIC | Age: 39
End: 2019-06-28

## 2019-06-28 ENCOUNTER — OFFICE VISIT (OUTPATIENT)
Dept: ORTHOPEDIC SURGERY | Facility: CLINIC | Age: 39
End: 2019-06-28

## 2019-06-28 VITALS — HEIGHT: 61 IN | WEIGHT: 106.7 LBS | OXYGEN SATURATION: 99 % | BODY MASS INDEX: 20.15 KG/M2 | HEART RATE: 91 BPM

## 2019-06-28 DIAGNOSIS — M25.511 CHRONIC RIGHT SHOULDER PAIN: Primary | ICD-10-CM

## 2019-06-28 DIAGNOSIS — G89.29 CHRONIC RIGHT SHOULDER PAIN: Primary | ICD-10-CM

## 2019-06-28 DIAGNOSIS — M77.8 HAND TENDINITIS: Primary | ICD-10-CM

## 2019-06-28 DIAGNOSIS — M25.511 ACUTE PAIN OF RIGHT SHOULDER: ICD-10-CM

## 2019-06-28 PROCEDURE — 99212 OFFICE O/P EST SF 10 MIN: CPT | Performed by: ORTHOPAEDIC SURGERY

## 2019-06-28 PROCEDURE — 97110 THERAPEUTIC EXERCISES: CPT | Performed by: PHYSICAL THERAPIST

## 2019-06-28 PROCEDURE — 97140 MANUAL THERAPY 1/> REGIONS: CPT | Performed by: PHYSICAL THERAPIST

## 2019-06-28 NOTE — PROGRESS NOTES
[facility] Orthopaedic Surgery Clinic Note    Subjective     Chief Complaint   Patient presents with   • Right Shoulder - Follow-up     1 month follow up  Right shoulder pain   • Right Hand - Follow-up     1 month follow up        HPI  Elen Liang is a 39 y.o. female.  She says her shoulder no longer hurts.  Her hand is better but still hurts 7 out of 10 at times.  She believes she can return to work full duty.  History reviewed. No pertinent past medical history.   History reviewed. No pertinent surgical history.   Family History   Problem Relation Age of Onset   • No Known Problems Mother    • No Known Problems Father      Social History     Socioeconomic History   • Marital status:      Spouse name: Not on file   • Number of children: Not on file   • Years of education: Not on file   • Highest education level: Not on file   Tobacco Use   • Smoking status: Never Smoker   • Smokeless tobacco: Never Used   Substance and Sexual Activity   • Alcohol use: No     Frequency: Never   • Drug use: No   • Sexual activity: Defer      Current Outpatient Medications on File Prior to Visit   Medication Sig Dispense Refill   • cyclobenzaprine (FLEXERIL) 5 MG tablet   1   • ibuprofen (ADVIL,MOTRIN) 800 MG tablet   1     No current facility-administered medications on file prior to visit.       No Known Allergies     The following portions of the patient's history were reviewed and updated as appropriate: allergies, current medications, past family history, past medical history, past social history, past surgical history and problem list.    Review of Systems   Constitutional: Positive for activity change.   HENT: Negative.    Eyes: Negative.    Respiratory: Negative.    Cardiovascular: Negative.    Gastrointestinal: Negative.    Endocrine: Negative.    Genitourinary: Negative.    Musculoskeletal: Positive for arthralgias (right shoulder).   Skin: Negative.    Allergic/Immunologic: Negative.    Neurological: Negative.   "  Hematological: Negative.    Psychiatric/Behavioral: Negative.         Objective      Physical Exam  Pulse 91   Ht 154.9 cm (60.98\")   Wt 48.4 kg (106 lb 11.2 oz)   SpO2 99%   Breastfeeding? No   BMI 20.17 kg/m²     Body mass index is 20.17 kg/m².    GENERAL APPEARANCE: awake, alert & oriented x 3, in no acute distress and well developed, well nourished  PSYCH: normal mood and affect    Ortho Exam  Neurologic   Left Upper Extremity    Left wrist extensors: 5/5    Left wrist flexors: 5/5    Left intrinsics: 5/5      Right Upper Extremity    Right wrist extensors: 5/5    Right wrist flexors: 5/5    Right intrinsics: 5/5    Musculoskeletal   Left Elbow    Forearm supination: AROM - 90 degrees    Forearm pronation: AROM - 90 degrees     Right Elbow    Forearm supination: AROM - 90 degrees    Forearm pronation: AROM - 90 degrees     Left Wrist    Flexion: AROM - 90 degrees    Extension: AROM - 90 degrees     Right Wrist    Flexion: AROM - 90 degrees    Extension: AROM - 90 degrees     Inspection and Palpation   Right Hand:      Tenderness - none    Swelling - none    Crepitus - none    Muscle tone - no atrophy     Left Hand    Tenderness - none    Swelling - none    Crepitus - none    Muscle tone - no atrophy     Strength and Tone    Right  strength: good    Left  strength: good     Deformities, Malalignments, Discrepancies    None     Phalanges    Full range of motion of bilateral thumb MCPJs and IPJs.     Full range of motion of bilateral index finger MCPJs, PIPs, and DIPJs    Full range of motion of bilateral middle finder MCPJs, PIPs, and DIPJs    Full range of motion of bilateral ring finger MCPJs, PIPs, and DIPJs    Full range of motion of bilateral small finger MCPJs, PIPs, and DIPJs      FDS, FDP, and extensor tendons are intact in bilateral index, middle, ring, and small fingers. FPL and extensor tendons are intact in bilateral thumbs.  No palpable triggering.    Imaging/Studies  Imaging Results " (last 7 days)     ** No results found for the last 168 hours. **          Assessment/Plan        ICD-10-CM ICD-9-CM   1. Hand tendinitis M77.8 727.05     She is doing better and will will return to work full duty.  I will see her back as needed.  Medical Decision Making  Management Options : over-the-counter medicine      Ezequiel Higgins MD  06/28/19  11:33 AM         EMR Dragon/Transcription disclaimer:  Much of this encounter note is an electronic transcription of spoken language to printed text. Electronic transcription of spoken language may permit erroneous, or at times, nonsensical words or phrases to be inadvertently transcribed. Although I have reviewed the note for such errors, some may still exist.

## 2019-06-28 NOTE — PROGRESS NOTES
Physical Therapy Daily Progress Note      Visit # : 18    Elen Liang reports 0/10 pain today at rest in the hand.   Only when she does something with the hand she has pain 5-6/10 pain.  Pt with MD visit today and the MD stated that she is going to go back to work on Monday.          Objective Pt presents to PT today with no distress noted at rest.      R UE 51 #, 50#,  L UE 56#,  52#    Lateral pinch R 11#,  L UE 13#    R shoulder full ROM noted.  She does report some ERP but otherwise WNL's.     C/S ROM is WNL's  Without elevated pain noted in the arm.     Palpation:  Tenderness noted Volar aspect of the D1 of the LF and the Volar MP joint.  Slight thickened tendon also at that area.       See Exercise, Manual, and Modality Logs for complete treatment.     Assessment/Plan  Pt has been released by MD back to full duty.  PT will hold at this time unless other wise ordered by MD.  She seems to have met all the goals for the shoulder and neck but the hand seems to still have some lingering sxs.       Hold until further MD orders.            Manual Therapy:    10     mins  70814;  Therapeutic Exercise:    40     mins  82458;     Neuromuscular Wendie:        mins  95710;    Therapeutic Activity:          mins  81031;     Gait Training:        ___  mins  23480;     Ultrasound:          mins  55557;    Electrical Stimulation:         mins  02452 ( );  Dry Needling          mins self-pay    Timed Treatment:   50   mins   Total Treatment:     50   mins    Sunday Kohli, PT  Physical Therapist